# Patient Record
Sex: FEMALE | Race: WHITE | NOT HISPANIC OR LATINO | Employment: UNEMPLOYED | URBAN - METROPOLITAN AREA
[De-identification: names, ages, dates, MRNs, and addresses within clinical notes are randomized per-mention and may not be internally consistent; named-entity substitution may affect disease eponyms.]

---

## 2019-02-07 ENCOUNTER — APPOINTMENT (OUTPATIENT)
Dept: WOMENS IMAGING | Facility: HOSPITAL | Age: 44
End: 2019-02-07

## 2019-02-07 PROCEDURE — 77063 BREAST TOMOSYNTHESIS BI: CPT | Performed by: RADIOLOGY

## 2019-02-07 PROCEDURE — 77067 SCR MAMMO BI INCL CAD: CPT | Performed by: RADIOLOGY

## 2020-04-20 ENCOUNTER — TRANSCRIBE ORDERS (OUTPATIENT)
Dept: ADMINISTRATIVE | Facility: HOSPITAL | Age: 45
End: 2020-04-20

## 2020-04-20 DIAGNOSIS — N63.20 LUMP OF LEFT BREAST: Primary | ICD-10-CM

## 2020-04-22 ENCOUNTER — APPOINTMENT (OUTPATIENT)
Dept: MAMMOGRAPHY | Facility: HOSPITAL | Age: 45
End: 2020-04-22

## 2020-04-22 ENCOUNTER — APPOINTMENT (OUTPATIENT)
Dept: ULTRASOUND IMAGING | Facility: HOSPITAL | Age: 45
End: 2020-04-22

## 2020-04-22 ENCOUNTER — APPOINTMENT (OUTPATIENT)
Dept: WOMENS IMAGING | Facility: HOSPITAL | Age: 45
End: 2020-04-22

## 2020-04-22 PROCEDURE — 76641 ULTRASOUND BREAST COMPLETE: CPT | Performed by: RADIOLOGY

## 2020-04-22 PROCEDURE — G0279 TOMOSYNTHESIS, MAMMO: HCPCS | Performed by: RADIOLOGY

## 2020-04-22 PROCEDURE — 77066 DX MAMMO INCL CAD BI: CPT | Performed by: RADIOLOGY

## 2020-04-22 PROCEDURE — 77062 BREAST TOMOSYNTHESIS BI: CPT | Performed by: RADIOLOGY

## 2020-04-23 ENCOUNTER — APPOINTMENT (OUTPATIENT)
Dept: MAMMOGRAPHY | Facility: HOSPITAL | Age: 45
End: 2020-04-23

## 2020-04-23 ENCOUNTER — APPOINTMENT (OUTPATIENT)
Dept: ULTRASOUND IMAGING | Facility: HOSPITAL | Age: 45
End: 2020-04-23

## 2020-05-01 ENCOUNTER — APPOINTMENT (OUTPATIENT)
Dept: WOMENS IMAGING | Facility: HOSPITAL | Age: 45
End: 2020-05-01

## 2020-05-01 PROCEDURE — 19083 BX BREAST 1ST LESION US IMAG: CPT | Performed by: RADIOLOGY

## 2020-05-01 PROCEDURE — 19084 BX BREAST ADD LESION US IMAG: CPT | Performed by: RADIOLOGY

## 2020-05-01 PROCEDURE — 19000 PUNCTURE ASPIR CYST BREAST: CPT | Performed by: RADIOLOGY

## 2020-08-20 ENCOUNTER — PREP FOR SURGERY (OUTPATIENT)
Dept: OTHER | Facility: HOSPITAL | Age: 45
End: 2020-08-20

## 2020-08-20 ENCOUNTER — OFFICE VISIT (OUTPATIENT)
Dept: GASTROENTEROLOGY | Facility: CLINIC | Age: 45
End: 2020-08-20

## 2020-08-20 VITALS
SYSTOLIC BLOOD PRESSURE: 122 MMHG | OXYGEN SATURATION: 98 % | BODY MASS INDEX: 24.96 KG/M2 | HEART RATE: 78 BPM | DIASTOLIC BLOOD PRESSURE: 78 MMHG | HEIGHT: 64 IN | WEIGHT: 146.2 LBS | TEMPERATURE: 98.7 F

## 2020-08-20 DIAGNOSIS — Z12.11 COLON CANCER SCREENING: ICD-10-CM

## 2020-08-20 DIAGNOSIS — E53.8 VITAMIN B12 DEFICIENCY: Primary | ICD-10-CM

## 2020-08-20 DIAGNOSIS — D51.9 ANEMIA DUE TO VITAMIN B12 DEFICIENCY, UNSPECIFIED B12 DEFICIENCY TYPE: ICD-10-CM

## 2020-08-20 DIAGNOSIS — D64.9 ANEMIA, UNSPECIFIED TYPE: ICD-10-CM

## 2020-08-20 DIAGNOSIS — E55.9 VITAMIN D DEFICIENCY: ICD-10-CM

## 2020-08-20 DIAGNOSIS — E53.8 VITAMIN B12 DEFICIENCY: ICD-10-CM

## 2020-08-20 PROCEDURE — 99204 OFFICE O/P NEW MOD 45 MIN: CPT | Performed by: INTERNAL MEDICINE

## 2020-08-20 RX ORDER — MELATONIN
1000 3 TIMES WEEKLY
COMMUNITY

## 2020-08-20 NOTE — PROGRESS NOTES
Colonoscopy (discuss CLS; no previous; vitamin b12 deficiency)      HPI    44-year old female presents to the office today as a new patient (previous patient) to discuss anemia and possible need for CLS. She reports being identified as having low vitamin B12 and vitamin D. She is currently receiving injections of Vitamin B12 now twice monthly. She reports heavy menstrual cycles. She takes Vitamin D deficiency 1000 IU 3 x weekly.     She denies any upper or lower GI symptoms. She denies any rectal bleeding or change in bowel habits.     She has never had a colonoscopy. She denies any family history of colon cancer.     Review of Systems   Constitutional: Negative for appetite change, chills, diaphoresis, fatigue, fever and unexpected weight change.   HENT: Negative for dental problem, ear pain, mouth sores, rhinorrhea, sore throat and voice change.    Eyes: Negative for pain, redness and visual disturbance.   Respiratory: Negative for cough, chest tightness and wheezing.    Cardiovascular: Negative for chest pain, palpitations and leg swelling.   Endocrine: Negative for cold intolerance, heat intolerance, polydipsia, polyphagia and polyuria.   Genitourinary: Negative for dysuria, frequency, hematuria and urgency.   Musculoskeletal: Negative for arthralgias, back pain, joint swelling, myalgias and neck pain.   Skin: Negative for rash.   Allergic/Immunologic: Negative for environmental allergies, food allergies and immunocompromised state.   Neurological: Negative for dizziness, seizures, weakness, numbness and headaches.   Hematological: Does not bruise/bleed easily.   Psychiatric/Behavioral: Negative for sleep disturbance. The patient is not nervous/anxious.         I have reviewed and confirmed the accuracy of the HPI and ROS as documented by the APRN TRENTON Ruiz     Problem List:  There is no problem list on file for this patient.      Medical History:    Past Medical History:   Diagnosis Date   • B12  "deficiency    • Vitamin D deficiency         Social History:    Social History     Socioeconomic History   • Marital status:      Spouse name: Not on file   • Number of children: Not on file   • Years of education: Not on file   • Highest education level: Not on file   Tobacco Use   • Smoking status: Never Smoker   • Smokeless tobacco: Never Used   Substance and Sexual Activity   • Alcohol use: Yes     Comment: social   • Drug use: Never       Family History:   Family History   Problem Relation Age of Onset   • Lung cancer Maternal Grandmother    • Colon cancer Neg Hx    • Colon polyps Neg Hx    • Ulcerative colitis Neg Hx    • Irritable bowel syndrome Neg Hx    • Crohn's disease Neg Hx        Surgical History:   Past Surgical History:   Procedure Laterality Date   • BREAST SURGERY     • TONSILLECTOMY     • WRIST SURGERY           Current Outpatient Medications:   •  cholecalciferol (VITAMIN D3) 25 MCG (1000 UT) tablet, Take 1,000 Units by mouth 3 (Three) Times a Week., Disp: , Rfl:   •  Norethin-Eth Estrad-Fe Biphas (Lo Loestrin Fe) 1 MG-10 MCG / 10 MCG tablet, Daily., Disp: , Rfl:   •  Cyanocobalamin 1000 MCG/ML kit, Inject 1 mL as directed Every 14 (Fourteen) Days., Disp: 2 each, Rfl: 3  •  Needle, Disp, (BD Disp Needles) 25G X 5/8\" misc, Use with B-12 injection twice monthly, Disp: 2 each, Rfl: 3    Allergies:   Allergies   Allergen Reactions   • Cat Hair Extract Hives        The following portions of the patient's history were reviewed and updated as appropriate: allergies, current medications, past family history, past medical history, past social history, past surgical history and problem list.    Vitals:    08/20/20 1411   BP: 122/78   Pulse: 78   Temp: 98.7 °F (37.1 °C)   SpO2: 98%         08/20/20  1411   Weight: 66.3 kg (146 lb 3.2 oz)     Body mass index is 25.1 kg/m².      Physical Exam   HENT:   Nose: No nasal deformity.   Eyes: No scleral icterus.   Neck:   Trachea midline.   Cardiovascular: " Normal rate and regular rhythm.   Pulmonary/Chest: Breath sounds normal. No respiratory distress.   Abdominal: Soft. Normal appearance and bowel sounds are normal. She exhibits no distension and no mass. There is no tenderness.   Lymphadenopathy:   No periumbilical lymphadenopathy.   Neurological: She is alert.   Skin: Skin is warm. No cyanosis.   Psychiatric: She has a normal mood and affect.   Vitals reviewed.       Assessment/ Plan  Lelo was seen today for colonoscopy.    Diagnoses and all orders for this visit:    Vitamin B12 deficiency  -     Folate  -     Vitamin B12  -     Vitamin D 25 Hydroxy  -     Anti-parietal Antibody; Future  -     Methylmalonic Acid, Serum  -     Gastrin; Future    Vitamin D deficiency  -     Folate  -     Vitamin B12  -     Vitamin D 25 Hydroxy  -     Anti-parietal Antibody; Future  -     Methylmalonic Acid, Serum  -     Gastrin; Future    Anemia due to vitamin B12 deficiency, unspecified B12 deficiency type  -     Folate  -     Vitamin B12  -     Vitamin D 25 Hydroxy  -     Anti-parietal Antibody; Future  -     Methylmalonic Acid, Serum  -     Gastrin; Future    Colon cancer screening  -     Folate  -     Vitamin B12  -     Vitamin D 25 Hydroxy  -     Anti-parietal Antibody; Future  -     Methylmalonic Acid, Serum  -     Gastrin; Future         Return for at procedures.    There are no Patient Instructions on file for this visit.    Documentation by Janette CHOWDHURY acting as a scribe in the following sections (HPI, Assessment, Plan) for the undersigned provider.     Discussion:

## 2020-08-23 LAB
25(OH)D3+25(OH)D2 SERPL-MCNC: 60.7 NG/ML (ref 30–100)
FOLATE SERPL-MCNC: 3.68 NG/ML (ref 4.78–24.2)
GASTRIN SERPL-MCNC: 25 PG/ML (ref 0–115)
Lab: NORMAL
METHYLMALONATE SERPL-SCNC: 101 NMOL/L (ref 0–378)
PCA AB SER-ACNC: 1.3 UNITS (ref 0–20)
VIT B12 SERPL-MCNC: 942 PG/ML (ref 211–946)

## 2020-08-25 ENCOUNTER — TELEPHONE (OUTPATIENT)
Dept: GASTROENTEROLOGY | Facility: CLINIC | Age: 45
End: 2020-08-25

## 2020-10-02 ENCOUNTER — OFFICE VISIT (OUTPATIENT)
Dept: GASTROENTEROLOGY | Facility: CLINIC | Age: 45
End: 2020-10-02

## 2020-10-02 DIAGNOSIS — E53.8 FOLATE DEFICIENCY: ICD-10-CM

## 2020-10-02 DIAGNOSIS — E53.8 VITAMIN B12 DEFICIENCY: Primary | ICD-10-CM

## 2020-10-02 DIAGNOSIS — E55.9 VITAMIN D DEFICIENCY: ICD-10-CM

## 2020-10-02 DIAGNOSIS — Z12.11 COLON CANCER SCREENING: ICD-10-CM

## 2020-10-02 PROCEDURE — 99443 PR PHYS/QHP TELEPHONE EVALUATION 21-30 MIN: CPT | Performed by: NURSE PRACTITIONER

## 2020-10-02 RX ORDER — ALBUTEROL SULFATE 90 UG/1
2 AEROSOL, METERED RESPIRATORY (INHALATION)
COMMUNITY
Start: 2020-09-09 | End: 2022-05-03

## 2020-10-02 RX ORDER — NEEDLES, DISPOSABLE 25GX5/8"
NEEDLE, DISPOSABLE MISCELLANEOUS
Qty: 4 EACH | Refills: 0 | Status: SHIPPED | OUTPATIENT
Start: 2020-10-02 | End: 2020-11-13

## 2020-10-02 RX ORDER — CYANOCOBALAMIN 1000 UG/ML
1000 INJECTION, SOLUTION INTRAMUSCULAR; SUBCUTANEOUS WEEKLY
Status: DISCONTINUED | OUTPATIENT
Start: 2020-10-02 | End: 2020-10-06

## 2020-10-02 RX ORDER — NITROFURANTOIN 25; 75 MG/1; MG/1
CAPSULE ORAL AS NEEDED
COMMUNITY
Start: 2020-07-11 | End: 2022-05-03

## 2020-10-02 NOTE — PATIENT INSTRUCTIONS
1.  For vitamin B12 deficiency, we will prescribe vitamin B12 1000 mcg injections weekly x4.  We will then plan to recheck your vitamin B12 level to determine the best dosing schedule to manage symptoms.    2.  Continue folate supplement with folic acid 1 mg daily.    3.  Continue vitamin D3 supplement with 1000 IU daily.    4.  We will plan to proceed with EGD and colonoscopy as ordered, but will extend out the date of your procedures as outlined in the discussion section of this office visit.

## 2020-10-02 NOTE — PROGRESS NOTES
"Chief Complaint   Patient presents with   • Follow-up     Discuss Vit B-12 levels        HPI  44-year-old female presents today for telephone follow-up visit.  She was last seen in office on 8/20/2020.  She has a history of vitamin B12 and vitamin D deficiency and is currently being evaluated for possible pernicious anemia.     Lab work-up for pernicious anemia demonstrated normal findings on antiparietal cell antibody, gastrin, methylmalonic acid, and vitamin D.  Vitamin D level was normal at 60.7, however patient was already taking Vitamin D supplement at that time. Folate level was mildly low at 3.68 and patient was started on folic acid 1 mg daily, which she continues.  Plan of care at last office visit was to proceed with EGD for further work up of possible pernicious anemia and perform a screening colonoscopy.  She is currently scheduled for her EGD and colonoscopy on 10/15/2020.     She denies having any heartburn or reflux symptoms.  She will experience a stomachach and weakness when her vitamin B12 is low.  She states that she always \"knows\" when her level is low, as she will experience a stomach ache, become weak, and experience constipation.  Her most recent vitamin B12 level drawn on 8/20/2020 was 942.  This was her level after she had received a total of 4 weekly vitamin B12 injections, followed by another 3 injections the following month.  Initially she was prescribed vitamin B12 1000 mcg injections monthly to follow her weekly injections for 1 month; however, due to an increase in the above symptoms, she reports paying out-of-pocket for 2 separate injections with Dr. Shaq Rich which greatly improved her overall health.    Sh reports having regular daily bowel movements and denies having any diarrhea, constipation, melena, or hematochezia.  She reports that her weight is stable.  She feels that if she could receive at least 3 vitamin B12 injections per month that she would feel much " better.    You have chosen to receive care through a telephone visit today. Do you consent to use a telephone visit for your medical care today? Yes      Review of Systems   Constitutional: Negative for appetite change, chills, diaphoresis, fatigue, fever and unexpected weight change.   HENT: Negative for dental problem, ear pain, mouth sores, rhinorrhea, sore throat and voice change.    Eyes: Negative for pain, redness and visual disturbance.   Respiratory: Negative for cough, chest tightness and wheezing.    Cardiovascular: Negative for chest pain, palpitations and leg swelling.   Endocrine: Negative for cold intolerance, heat intolerance, polydipsia, polyphagia and polyuria.   Genitourinary: Negative for dysuria, frequency, hematuria and urgency.   Musculoskeletal: Negative for arthralgias, back pain, joint swelling, myalgias and neck pain.   Skin: Negative for rash.   Allergic/Immunologic: Positive for environmental allergies. Negative for food allergies and immunocompromised state.   Neurological: Negative for dizziness, seizures, weakness, numbness and headaches.   Hematological: Does not bruise/bleed easily.   Psychiatric/Behavioral: Negative for sleep disturbance. The patient is not nervous/anxious.        Problem List:  There is no problem list on file for this patient.      Medical History:    Past Medical History:   Diagnosis Date   • B12 deficiency    • Vitamin D deficiency         Social History:    Social History     Socioeconomic History   • Marital status:      Spouse name: Not on file   • Number of children: Not on file   • Years of education: Not on file   • Highest education level: Not on file   Tobacco Use   • Smoking status: Never Smoker   • Smokeless tobacco: Never Used   Substance and Sexual Activity   • Alcohol use: Yes     Comment: social   • Drug use: Never   • Sexual activity: Defer       Family History:   Family History   Problem Relation Age of Onset   • Lung cancer Maternal  "Grandmother    • Irritable bowel syndrome Mother    • Colon cancer Neg Hx    • Colon polyps Neg Hx    • Ulcerative colitis Neg Hx    • Crohn's disease Neg Hx        Surgical History:   Past Surgical History:   Procedure Laterality Date   • BREAST SURGERY     • TONSILLECTOMY     • WRIST SURGERY           Current Outpatient Medications:   •  albuterol sulfate HFA (ProAir HFA) 108 (90 Base) MCG/ACT inhaler, Inhale 2 puffs., Disp: , Rfl:   •  cholecalciferol (VITAMIN D3) 25 MCG (1000 UT) tablet, Take 1,000 Units by mouth 3 (Three) Times a Week., Disp: , Rfl:   •  Cyanocobalamin 1000 MCG/ML kit, Inject 1 mL as directed Every 14 (Fourteen) Days., Disp: 2 each, Rfl: 3  •  Needle, Disp, ([employer] Disp Needles) 25G X 5/8\" misc, Use with B-12 injection twice monthly, Disp: 2 each, Rfl: 3  •  nitrofurantoin, macrocrystal-monohydrate, (MACROBID) 100 MG capsule, As Needed., Disp: , Rfl:   •  Norethin-Eth Estrad-Fe Biphas (Lo Loestrin Fe) 1 MG-10 MCG / 10 MCG tablet, Daily., Disp: , Rfl:     Allergies:  Cat hair extract    The following portions of the patient's history were reviewed and updated as appropriate: allergies, current medications, past family history, past medical history, past social history, past surgical history and problem list.    Assessment/ Plan  Lelo was seen today for follow-up.    Diagnoses and all orders for this visit:    Vitamin B12 deficiency    Folate deficiency    Colon cancer screening         Return for Follow-up after EGD and colonoscopy.    Patient Instructions   1.  For vitamin B12 deficiency, we will prescribe vitamin B12 1000 mcg injections weekly x4.  We will then plan to recheck your vitamin B12 level to determine the best dosing schedule to manage symptoms.    2.  Continue folate supplement with folic acid 1 mg daily.    3.  Continue vitamin D3 supplement with 1000 IU daily.    4.  We will plan to proceed with EGD and colonoscopy as ordered, but will extend out the date of your procedures as " outlined in the discussion section of this office visit.      Discussion:  Discussion was held with the patient today regarding management of her vitamin B12 deficiency.  We will place patient on vitamin B12 1000 mcg via injection weekly x 4.  We will then plan to check a vitamin B12 level to determine the most appropriate dosing schedule.  Patient to continue daily folic acid supplementation and daily vitamin D supplementation.    Due to the patient's concerns over her son's symptoms, who is also a patient in this practice, she requests that her procedures be postponed and rescheduled for a future date so that her son can have his procedures performed during her already schedule time slot on 10/15/2020.  APRN reviewed change with Dr. Vargas's .  Patient has been rescheduled for January 2020 for both her EGD and colonoscopy.  We will plan for office follow-up after procedures to discuss results and reassess symptoms.  Patient verbalized understanding of above.  All questions answered and support provided.    This visit was completed as a telephone visit due to COVID-19 pandemic. This visit has been rescheduled as a phone visit to comply with patient safety concerns in accordance with CDC recommendations. Total time of discussion was 22 minutes.

## 2020-10-05 ENCOUNTER — TELEPHONE (OUTPATIENT)
Dept: GASTROENTEROLOGY | Facility: CLINIC | Age: 45
End: 2020-10-05

## 2020-10-05 NOTE — TELEPHONE ENCOUNTER
Spoke with patient, she stated that you made changes to her Vitamin B-12 but did not send in the medication. She received the needles. Please review and send in Rx.      Thanks,  Dayne

## 2020-10-06 DIAGNOSIS — E53.8 VITAMIN B12 DEFICIENCY: Primary | ICD-10-CM

## 2020-10-06 RX ORDER — CYANOCOBALAMIN 1000 UG/ML
1000 INJECTION, SOLUTION INTRAMUSCULAR; SUBCUTANEOUS WEEKLY
Status: SHIPPED | OUTPATIENT
Start: 2020-10-06 | End: 2020-11-03

## 2020-10-07 NOTE — TELEPHONE ENCOUNTER
Spoke with patient, message below passed to her in full detail. She understands and will  Rx. Nothing further needed.      TS

## 2020-11-06 ENCOUNTER — RESULTS ENCOUNTER (OUTPATIENT)
Dept: GASTROENTEROLOGY | Facility: CLINIC | Age: 45
End: 2020-11-06

## 2020-11-06 DIAGNOSIS — E53.8 VITAMIN B12 DEFICIENCY: ICD-10-CM

## 2020-11-11 LAB — VIT B12 SERPL-MCNC: 1011 PG/ML (ref 211–946)

## 2020-11-13 RX ORDER — NEEDLES, FILTER 19GX1 1/2"
NEEDLE, DISPOSABLE MISCELLANEOUS
Qty: 4 EACH | Refills: 1 | Status: SHIPPED | OUTPATIENT
Start: 2020-11-13 | End: 2021-03-08

## 2020-12-09 ENCOUNTER — TELEPHONE (OUTPATIENT)
Dept: GASTROENTEROLOGY | Facility: CLINIC | Age: 45
End: 2020-12-09

## 2020-12-09 NOTE — TELEPHONE ENCOUNTER
----- Message from Lelo Baird sent at 12/9/2020 11:27 AM EST -----  Regarding: Prescription Question  Contact: [phone]  Hi Chris miranda! Sorry to bother you. I was able to go to Guero's MyChart to refill request his B12 refill with no problem and the pharmacy has it ready but it is not listed on mine. So I'm requesting a refill via email. Also just wanted to touch base. Guero is at his food intolerance appointment as we speak! 2 weeks ago he had a rough spot where he was so nauseous that he could not eat or hardly get out of bed. (Im shweta thinking it may have been a virus ) but he's still shweta weak feeling. I had some zofran left over from his gallbladder surgery so he took three pills over a three day period. 1 per day. Just to be able to eat. He's better now but missed a few days of class. Hopefully we are heading back on the right track. Looking forward to our follow up next Friday I believe. Have a great rest of the week!

## 2020-12-10 RX ORDER — CYANOCOBALAMIN 1000 UG/ML
1000 INJECTION, SOLUTION INTRAMUSCULAR; SUBCUTANEOUS WEEKLY
Qty: 4 ML | Refills: 5 | Status: SHIPPED | OUTPATIENT
Start: 2020-12-10 | End: 2021-04-03 | Stop reason: SDUPTHER

## 2020-12-10 RX ORDER — CYANOCOBALAMIN 1000 UG/ML
1000 INJECTION, SOLUTION INTRAMUSCULAR; SUBCUTANEOUS WEEKLY
Qty: 4 ML | Refills: 5 | Status: SHIPPED | OUTPATIENT
Start: 2020-12-10 | End: 2020-12-10 | Stop reason: SDUPTHER

## 2021-01-27 ENCOUNTER — TRANSCRIBE ORDERS (OUTPATIENT)
Dept: LAB | Facility: SURGERY CENTER | Age: 46
End: 2021-01-27

## 2021-01-27 DIAGNOSIS — Z01.818 OTHER SPECIFIED PRE-OPERATIVE EXAMINATION: Primary | ICD-10-CM

## 2021-02-03 ENCOUNTER — PREP FOR SURGERY (OUTPATIENT)
Dept: SURGERY | Facility: SURGERY CENTER | Age: 46
End: 2021-02-03

## 2021-02-03 ENCOUNTER — TELEPHONE (OUTPATIENT)
Dept: GASTROENTEROLOGY | Facility: CLINIC | Age: 46
End: 2021-02-03

## 2021-02-03 DIAGNOSIS — K22.70 BARRETT'S ESOPHAGUS WITHOUT DYSPLASIA: Primary | ICD-10-CM

## 2021-02-03 RX ORDER — SODIUM CHLORIDE, SODIUM LACTATE, POTASSIUM CHLORIDE, CALCIUM CHLORIDE 600; 310; 30; 20 MG/100ML; MG/100ML; MG/100ML; MG/100ML
30 INJECTION, SOLUTION INTRAVENOUS CONTINUOUS PRN
Status: CANCELLED | OUTPATIENT
Start: 2021-02-03

## 2021-02-03 RX ORDER — SODIUM CHLORIDE 0.9 % (FLUSH) 0.9 %
10 SYRINGE (ML) INJECTION AS NEEDED
Status: CANCELLED | OUTPATIENT
Start: 2021-02-03

## 2021-02-03 RX ORDER — SODIUM CHLORIDE 0.9 % (FLUSH) 0.9 %
3 SYRINGE (ML) INJECTION EVERY 12 HOURS SCHEDULED
Status: CANCELLED | OUTPATIENT
Start: 2021-02-03

## 2021-02-03 NOTE — TELEPHONE ENCOUNTER
Pt called and said that she has a procedure on 2/5/2021 with Dr. Vargas. Pt found out that her mom has Covid and the pt has been around her mom. Pt would like to reschedule her appt.

## 2021-02-04 NOTE — TELEPHONE ENCOUNTER
Called patient again still full sent another sms but patient is on for April and had already cancelled 2/5

## 2021-02-19 ENCOUNTER — TRANSCRIBE ORDERS (OUTPATIENT)
Dept: LAB | Facility: SURGERY CENTER | Age: 46
End: 2021-02-19

## 2021-02-19 DIAGNOSIS — Z01.818 OTHER SPECIFIED PRE-OPERATIVE EXAMINATION: Primary | ICD-10-CM

## 2021-03-08 RX ORDER — NEEDLES, FILTER 19GX1 1/2"
NEEDLE, DISPOSABLE MISCELLANEOUS
Qty: 4 EACH | Refills: 1 | Status: SHIPPED | OUTPATIENT
Start: 2021-03-08 | End: 2021-04-03 | Stop reason: SDUPTHER

## 2021-04-05 RX ORDER — NEEDLES, FILTER 19GX1 1/2"
NEEDLE, DISPOSABLE MISCELLANEOUS
Qty: 4 EACH | Refills: 1 | Status: SHIPPED | OUTPATIENT
Start: 2021-04-05 | End: 2021-06-09

## 2021-04-05 RX ORDER — CYANOCOBALAMIN 1000 UG/ML
1000 INJECTION, SOLUTION INTRAMUSCULAR; SUBCUTANEOUS WEEKLY
Qty: 4 ML | Refills: 5 | Status: SHIPPED | OUTPATIENT
Start: 2021-04-05 | End: 2021-06-09

## 2021-04-14 ENCOUNTER — APPOINTMENT (OUTPATIENT)
Dept: LAB | Facility: SURGERY CENTER | Age: 46
End: 2021-04-14

## 2021-06-09 RX ORDER — NEEDLES, FILTER 19GX1 1/2"
NEEDLE, DISPOSABLE MISCELLANEOUS
Qty: 4 EACH | Refills: 1 | Status: SHIPPED | OUTPATIENT
Start: 2021-06-09

## 2021-06-09 RX ORDER — CYANOCOBALAMIN 1000 UG/ML
1000 INJECTION, SOLUTION INTRAMUSCULAR; SUBCUTANEOUS WEEKLY
Qty: 4 ML | Refills: 5 | Status: SHIPPED | OUTPATIENT
Start: 2021-06-09 | End: 2021-08-11 | Stop reason: SDUPTHER

## 2021-06-09 RX ORDER — CYANOCOBALAMIN 1000 UG/ML
INJECTION, SOLUTION INTRAMUSCULAR; SUBCUTANEOUS
Qty: 4 ML | Refills: 5 | Status: SHIPPED | OUTPATIENT
Start: 2021-06-09 | End: 2021-06-09 | Stop reason: SDUPTHER

## 2021-08-11 RX ORDER — CYANOCOBALAMIN 1000 UG/ML
1000 INJECTION, SOLUTION INTRAMUSCULAR; SUBCUTANEOUS WEEKLY
Qty: 4 ML | Refills: 5 | Status: SHIPPED | OUTPATIENT
Start: 2021-08-11 | End: 2021-09-01 | Stop reason: SDUPTHER

## 2021-09-01 RX ORDER — CYANOCOBALAMIN 1000 UG/ML
1000 INJECTION, SOLUTION INTRAMUSCULAR; SUBCUTANEOUS WEEKLY
Qty: 4 ML | Refills: 5 | Status: SHIPPED | OUTPATIENT
Start: 2021-09-01 | End: 2021-11-09

## 2021-11-09 RX ORDER — CYANOCOBALAMIN 1000 UG/ML
INJECTION, SOLUTION INTRAMUSCULAR; SUBCUTANEOUS
Qty: 4 ML | Refills: 12 | Status: SHIPPED | OUTPATIENT
Start: 2021-11-09 | End: 2022-10-17

## 2022-03-14 ENCOUNTER — TRANSCRIBE ORDERS (OUTPATIENT)
Dept: ADMINISTRATIVE | Facility: HOSPITAL | Age: 47
End: 2022-03-14

## 2022-03-14 DIAGNOSIS — Z12.31 VISIT FOR SCREENING MAMMOGRAM: Primary | ICD-10-CM

## 2022-09-06 ENCOUNTER — PREP FOR SURGERY (OUTPATIENT)
Dept: SURGERY | Facility: SURGERY CENTER | Age: 47
End: 2022-09-06

## 2022-09-06 ENCOUNTER — OFFICE VISIT (OUTPATIENT)
Dept: GASTROENTEROLOGY | Facility: CLINIC | Age: 47
End: 2022-09-06

## 2022-09-06 VITALS
HEART RATE: 83 BPM | OXYGEN SATURATION: 99 % | DIASTOLIC BLOOD PRESSURE: 62 MMHG | SYSTOLIC BLOOD PRESSURE: 104 MMHG | TEMPERATURE: 97.8 F | WEIGHT: 151.4 LBS | HEIGHT: 65 IN | BODY MASS INDEX: 25.22 KG/M2

## 2022-09-06 DIAGNOSIS — Z79.2 ANTIBIOTIC LONG-TERM USE: ICD-10-CM

## 2022-09-06 DIAGNOSIS — Z12.11 COLON CANCER SCREENING: ICD-10-CM

## 2022-09-06 DIAGNOSIS — R14.0 ABDOMINAL BLOATING: Primary | ICD-10-CM

## 2022-09-06 DIAGNOSIS — K58.0 IRRITABLE BOWEL SYNDROME WITH DIARRHEA: ICD-10-CM

## 2022-09-06 DIAGNOSIS — R14.0 ABDOMINAL BLOATING: Primary | Chronic | ICD-10-CM

## 2022-09-06 PROCEDURE — 99214 OFFICE O/P EST MOD 30 MIN: CPT | Performed by: INTERNAL MEDICINE

## 2022-09-06 RX ORDER — SODIUM CHLORIDE 0.9 % (FLUSH) 0.9 %
10 SYRINGE (ML) INJECTION AS NEEDED
Status: CANCELLED | OUTPATIENT
Start: 2022-09-06

## 2022-09-06 RX ORDER — SODIUM CHLORIDE 0.9 % (FLUSH) 0.9 %
3 SYRINGE (ML) INJECTION EVERY 12 HOURS SCHEDULED
Status: CANCELLED | OUTPATIENT
Start: 2022-09-06

## 2022-09-06 RX ORDER — LEVOMEFOLATE CALCIUM 15 MG
TABLET ORAL
COMMUNITY

## 2022-09-06 RX ORDER — SODIUM CHLORIDE, SODIUM LACTATE, POTASSIUM CHLORIDE, CALCIUM CHLORIDE 600; 310; 30; 20 MG/100ML; MG/100ML; MG/100ML; MG/100ML
30 INJECTION, SOLUTION INTRAVENOUS CONTINUOUS PRN
Status: CANCELLED | OUTPATIENT
Start: 2022-09-06

## 2022-09-06 RX ORDER — PROMETHAZINE HYDROCHLORIDE 25 MG/1
1 TABLET ORAL EVERY 6 HOURS PRN
COMMUNITY
Start: 2022-05-04

## 2022-09-06 RX ORDER — NITROFURANTOIN 25; 75 MG/1; MG/1
CAPSULE ORAL
COMMUNITY
Start: 2022-08-15

## 2022-09-06 NOTE — PATIENT INSTRUCTIONS
We will place you on a course of Xifaxan. You will take 1 tab 3 x daily x 14 days. Coupon copay card provided. Please visit [employer] and fill out the required form to receive your medication for $0.     2.  We will schedule your EGD and colonoscopy.

## 2022-09-06 NOTE — PROGRESS NOTES
"Chief Complaint   Patient presents with   • Bloated   • Colon Cancer Screening         History of Present Illness  46-year old female presents today for follow up. She did have a case of [place] Spotted Fever following a tick bite. She did not contract Lyme's disease. She went to Urgent Care in [facility] and had the tick removed. She started treatment for Lyme's disease-although it was negative. Therapy was then discontinued. This occurred in May.    Vitamin B12 levels are normal. She continues therapy.     She reports a new findings of MTHFR-heterozygous. She has not taken L-methylfolate as of yet.     She continues to experience significant abdominal bloating. She has been on many antibiotics due to tick bite and subsequent treatment.     She was scheduled for a colonoscopy, but this was cancelled due to the death of her mother.     Review of Systems   Gastrointestinal: Positive for abdominal distention.        Result Review :           Vital Signs:   /62   Pulse 83   Temp 97.8 °F (36.6 °C)   Ht 163.8 cm (64.5\")   Wt 68.7 kg (151 lb 6.4 oz)   SpO2 99%   BMI 25.59 kg/m²     Body mass index is 25.59 kg/m².     Physical Exam      Assessment and Plan    Diagnoses and all orders for this visit:    1. Abdominal bloating (Primary)    2. Antibiotic long-term use    3. Irritable bowel syndrome with diarrhea    4. Colon cancer screening    Other orders  -     riFAXIMin (Xifaxan) 550 MG tablet; Take 1 tablet by mouth 3 (Three) Times a Day for 14 days.  Dispense: 42 tablet; Refill: 0       Documentation by Janette CHOWDHURY acting as a scribe in the following sections (HPI, Assessment, Plan) for the undersigned provider.       I have reviewed and confirmed the accuracy of the HPI and Assessment and Plan as documented by the TRENTON Fishman        Follow Up   Return for at procedures.    Patient Instructions   1.  We will place you on a course of Xifaxan. You will take 1 tab 3 x daily x 14 " days. Coupon copay card provided. Please visit [employer] and fill out the required form to receive your medication for $0.     2.  We will schedule your EGD and colonoscopy.

## 2022-09-07 PROBLEM — Z12.11 COLON CANCER SCREENING: Status: ACTIVE | Noted: 2022-09-07

## 2022-09-07 PROBLEM — Z79.2 ANTIBIOTIC LONG-TERM USE: Status: ACTIVE | Noted: 2022-09-07

## 2022-09-07 PROBLEM — R14.0 ABDOMINAL BLOATING: Status: ACTIVE | Noted: 2022-09-07

## 2022-09-09 ENCOUNTER — TELEPHONE (OUTPATIENT)
Dept: GASTROENTEROLOGY | Facility: CLINIC | Age: 47
End: 2022-09-09

## 2022-09-09 NOTE — TELEPHONE ENCOUNTER
Patient requesting Xifaxan be resent to [facility] in [place]. Pharmacy changed in chart.

## 2022-09-15 ENCOUNTER — TELEPHONE (OUTPATIENT)
Dept: GASTROENTEROLOGY | Facility: CLINIC | Age: 47
End: 2022-09-15

## 2022-09-15 RX ORDER — METRONIDAZOLE 250 MG/1
250 TABLET ORAL 3 TIMES DAILY
Qty: 30 TABLET | Refills: 0 | Status: SHIPPED | OUTPATIENT
Start: 2022-09-15 | End: 2022-09-25

## 2022-09-15 NOTE — TELEPHONE ENCOUNTER
PA was denied for xifaxan. Pt is asking to start another medication asap, she will be traveling next week. Thank you

## 2022-09-23 ENCOUNTER — TELEPHONE (OUTPATIENT)
Dept: GASTROENTEROLOGY | Facility: CLINIC | Age: 47
End: 2022-09-23

## 2022-09-23 DIAGNOSIS — Z18.39 EMBEDDED TICK OF UPPER BACK EXCLUDING SCAPULAR REGION, INITIAL ENCOUNTER: ICD-10-CM

## 2022-09-23 DIAGNOSIS — S20.459A EMBEDDED TICK OF UPPER BACK EXCLUDING SCAPULAR REGION, INITIAL ENCOUNTER: ICD-10-CM

## 2022-09-23 RX ORDER — FLUCONAZOLE 150 MG/1
150 TABLET ORAL ONCE
Qty: 2 TABLET | Refills: 0 | Status: SHIPPED | OUTPATIENT
Start: 2022-09-23 | End: 2022-09-23

## 2022-09-23 NOTE — TELEPHONE ENCOUNTER
Patient now has vaginal yeast infection after taking antibiotic.  Would like Diflucan sent to pharmacy if possible.

## 2022-10-17 RX ORDER — CYANOCOBALAMIN 1000 UG/ML
INJECTION, SOLUTION INTRAMUSCULAR; SUBCUTANEOUS
Qty: 4 ML | Refills: 12 | Status: SHIPPED | OUTPATIENT
Start: 2022-10-17

## 2023-03-01 ENCOUNTER — TELEPHONE (OUTPATIENT)
Dept: GASTROENTEROLOGY | Facility: CLINIC | Age: 48
End: 2023-03-01
Payer: OTHER GOVERNMENT

## 2023-04-25 ENCOUNTER — PREP FOR SURGERY (OUTPATIENT)
Dept: SURGERY | Facility: SURGERY CENTER | Age: 48
End: 2023-04-25
Payer: OTHER GOVERNMENT

## 2023-04-25 DIAGNOSIS — E53.8 VITAMIN B12 DEFICIENCY: Primary | ICD-10-CM

## 2023-04-25 DIAGNOSIS — Z79.2 ANTIBIOTIC LONG-TERM USE: ICD-10-CM

## 2023-04-25 DIAGNOSIS — Z12.11 COLON CANCER SCREENING: Primary | ICD-10-CM

## 2023-04-25 DIAGNOSIS — R14.0 ABDOMINAL BLOATING: ICD-10-CM

## 2023-04-25 RX ORDER — SODIUM CHLORIDE, SODIUM LACTATE, POTASSIUM CHLORIDE, CALCIUM CHLORIDE 600; 310; 30; 20 MG/100ML; MG/100ML; MG/100ML; MG/100ML
30 INJECTION, SOLUTION INTRAVENOUS CONTINUOUS PRN
OUTPATIENT
Start: 2023-04-25

## 2023-05-30 ENCOUNTER — TELEPHONE (OUTPATIENT)
Dept: GASTROENTEROLOGY | Facility: CLINIC | Age: 48
End: 2023-05-30

## 2023-08-15 ENCOUNTER — OFFICE VISIT (OUTPATIENT)
Dept: GASTROENTEROLOGY | Facility: CLINIC | Age: 48
End: 2023-08-15
Payer: OTHER GOVERNMENT

## 2023-08-15 VITALS
OXYGEN SATURATION: 98 % | WEIGHT: 146.7 LBS | HEIGHT: 65 IN | DIASTOLIC BLOOD PRESSURE: 60 MMHG | BODY MASS INDEX: 24.44 KG/M2 | HEART RATE: 88 BPM | TEMPERATURE: 97.9 F | SYSTOLIC BLOOD PRESSURE: 122 MMHG

## 2023-08-15 DIAGNOSIS — E53.8 VITAMIN B12 DEFICIENCY: Primary | ICD-10-CM

## 2023-08-15 DIAGNOSIS — Z12.11 COLON CANCER SCREENING: ICD-10-CM

## 2023-08-15 PROCEDURE — 99214 OFFICE O/P EST MOD 30 MIN: CPT | Performed by: NURSE PRACTITIONER

## 2023-08-15 RX ORDER — METHENAMINE, SODIUM PHOSPHATE, MONOBASIC, MONOHYDRATE, PHENYL SALICYLATE, METHYLENE BLUE, AND HYOSCYAMINE SULFATE 118; 40.8; 36; 10; .12 MG/1; MG/1; MG/1; MG/1; MG/1
1 CAPSULE ORAL
COMMUNITY
Start: 2023-06-22

## 2023-08-15 RX ORDER — RAMELTEON 8 MG/1
8 TABLET ORAL DAILY
COMMUNITY
Start: 2023-06-22

## 2023-08-15 RX ORDER — METRONIDAZOLE 250 MG/1
250 TABLET ORAL 3 TIMES DAILY
Qty: 30 TABLET | Refills: 1 | Status: SHIPPED | OUTPATIENT
Start: 2023-08-15

## 2023-08-15 RX ORDER — CYANOCOBALAMIN 1000 UG/ML
1000 INJECTION, SOLUTION INTRAMUSCULAR; SUBCUTANEOUS WEEKLY
Qty: 4 ML | Refills: 12 | Status: SHIPPED | OUTPATIENT
Start: 2023-08-15

## 2023-08-15 NOTE — PATIENT INSTRUCTIONS
For SIBO, we have sent in a prescription for Flagyl 250 mg take 1 tab 3 x daily x 10 days. We have provided one refill for this medication if you begin to experience a recurrence of symptoms over the next few months.     2.  Continue Vit B12 injections weekly. Refills have been sent to your pharmacy.     3.  Keep scheduled colonoscopy appointment in September.     4.  Recommend annual office follow up for reassessment of symptoms and med refills or sooner should symptoms worsen/recur.

## 2023-08-15 NOTE — PROGRESS NOTES
"Chief Complaint   Patient presents with    Follow-up     Vit B12 deficiency, SIBO         History of Present Illness  47-year-old female presents the office today for follow-up.  She was last seen in office on 9/6/2022.  She is a history of vitamin B12 deficiency/insufficiency and continues treatment. Most recent Vit B12 was 1188.     She has a history of MTHFR-heterozygous.  Use of L-methylfolate has been recommended. She just underwent [facility] testing as well.     She also has a history of SIBO. Treatment with Xifaxan was ineffective. Flagyl was effective and she reports that within 2 days her bowel habits regulated and her abdominal bloating was much improved. She has also been struggling with constipation.     At her last office visit she reported having a case of [place] spotted fever following a tick bite.  She was not noted to have Lyme's disease.  The tick was removed, but she did begin treatment for Lyme's disease prophylactically.    She is due for a colonoscopy at this time.  Her last colonoscopy was scheduled, but had to be canceled due to the death of her mother. She has her colonoscopy scheduled with Dr. Vargas this September.     She has followed up with GYN and is scheduled for an ultrasound next week and she has undergone lab testing.      Result Review :       Office Visit with Harmeet Vargas MD (09/06/2022)   THYROID PEROXIDASE ANTIBODY (08/03/2022 10:15)   VITAMIN D 25 HYDROXY (08/03/2022 10:15)   VITAMIN B12 (05/04/2022 15:14)   VITAMIN B12 (06/22/2023 15:23)     Vital Signs:   /60   Pulse 88   Temp 97.9 øF (36.6 øC)   Ht 163.8 cm (64.5\")   Wt 66.5 kg (146 lb 11.2 oz)   SpO2 98%   BMI 24.79 kg/mý     Body mass index is 24.79 kg/mý.     Physical Exam  Vitals reviewed.   Constitutional:       Appearance: Normal appearance.   Pulmonary:      Effort: Pulmonary effort is normal. No respiratory distress.   Abdominal:      General: Abdomen is flat. Bowel sounds are normal. " There is no distension.      Palpations: Abdomen is soft. There is no mass.      Tenderness: There is no abdominal tenderness. There is no guarding.   Musculoskeletal:         General: Normal range of motion.   Skin:     General: Skin is warm and dry.   Neurological:      General: No focal deficit present.      Mental Status: She is alert and oriented to person, place, and time.   Psychiatric:         Mood and Affect: Mood normal.         Behavior: Behavior normal.         Thought Content: Thought content normal.         Judgment: Judgment normal.     Assessment and Plan    Diagnoses and all orders for this visit:    1. Vitamin B12 deficiency (Primary)    2. Colon cancer screening    Other orders  -     metroNIDAZOLE (FLAGYL) 250 MG tablet; Take 1 tablet by mouth 3 (Three) Times a Day.  Dispense: 30 tablet; Refill: 1  -     cyanocobalamin 1000 MCG/ML injection; Inject 1 mL under the skin into the appropriate area as directed 1 (One) Time Per Week.  Dispense: 4 mL; Refill: 12           Patient Instructions   For SIBO, we have sent in a prescription for Flagyl 250 mg take 1 tab 3 x daily x 10 days. We have provided one refill for this medication if you begin to experience a recurrence of symptoms over the next few months.     2.  Continue Vit B12 injections weekly. Refills have been sent to your pharmacy.     3.  Keep scheduled colonoscopy appointment in September.     4.  Recommend annual office follow up for reassessment of symptoms and med refills or sooner should symptoms worsen/recur.       Discussion:    Patient to continue vitamin B12 injections weekly as this is worked very well to help manage her deficiency.  Refills have been provided to her pharmacy.    Patient is an upcoming colonoscopy scheduled in September, which we have recommended that she keep.    Patient has had a history of recurrent SIBO which has responded well to Flagyl in the past.  She is presenting with recurrent symptoms today including  abdominal bloating and change in bowel habits.  We will start her on Flagyl turn 50 mg 1 tablet 3 times daily for 10 days.  We have provided the patient with 1 refill in the event that she has a recurrence of symptoms over the next 12 months.  Plan for annual office follow-up for reassessment of symptoms and medication refills, or sooner should her symptoms worsen/recur.  Patient verbalized understanding of above plan of care and is in agreement.  All questions answered and support provided.    EMR Dragon/Transcription Disclaimer:  This document has been Dictated utilizing Dragon dictation.

## 2023-09-14 ENCOUNTER — ANESTHESIA (OUTPATIENT)
Dept: SURGERY | Facility: SURGERY CENTER | Age: 48
End: 2023-09-14
Payer: OTHER GOVERNMENT

## 2023-09-14 ENCOUNTER — ANESTHESIA EVENT (OUTPATIENT)
Dept: SURGERY | Facility: SURGERY CENTER | Age: 48
End: 2023-09-14
Payer: OTHER GOVERNMENT

## 2023-09-14 ENCOUNTER — HOSPITAL ENCOUNTER (OUTPATIENT)
Facility: SURGERY CENTER | Age: 48
Setting detail: HOSPITAL OUTPATIENT SURGERY
Discharge: HOME | End: 2023-09-14
Attending: INTERNAL MEDICINE | Admitting: INTERNAL MEDICINE
Payer: OTHER GOVERNMENT

## 2023-09-14 VITALS
HEIGHT: 64 IN | DIASTOLIC BLOOD PRESSURE: 97 MMHG | RESPIRATION RATE: 16 BRPM | TEMPERATURE: 97.3 F | WEIGHT: 148.4 LBS | SYSTOLIC BLOOD PRESSURE: 126 MMHG | HEART RATE: 81 BPM | BODY MASS INDEX: 25.33 KG/M2 | OXYGEN SATURATION: 98 %

## 2023-09-14 DIAGNOSIS — Z79.2 ANTIBIOTIC LONG-TERM USE: ICD-10-CM

## 2023-09-14 DIAGNOSIS — Z12.11 COLON CANCER SCREENING: ICD-10-CM

## 2023-09-14 DIAGNOSIS — R14.0 ABDOMINAL BLOATING: ICD-10-CM

## 2023-09-14 LAB
B-HCG UR QL: NEGATIVE
EXPIRATION DATE: ABNORMAL
INTERNAL NEGATIVE CONTROL: NEGATIVE
INTERNAL POSITIVE CONTROL: POSITIVE
Lab: ABNORMAL

## 2023-09-14 PROCEDURE — 45378 DIAGNOSTIC COLONOSCOPY: CPT | Performed by: INTERNAL MEDICINE

## 2023-09-14 PROCEDURE — 87081 CULTURE SCREEN ONLY: CPT | Performed by: INTERNAL MEDICINE

## 2023-09-14 PROCEDURE — 25010000002 PROPOFOL 10 MG/ML EMULSION: Performed by: STUDENT IN AN ORGANIZED HEALTH CARE EDUCATION/TRAINING PROGRAM

## 2023-09-14 PROCEDURE — 43239 EGD BIOPSY SINGLE/MULTIPLE: CPT | Performed by: INTERNAL MEDICINE

## 2023-09-14 PROCEDURE — 81025 URINE PREGNANCY TEST: CPT | Performed by: INTERNAL MEDICINE

## 2023-09-14 PROCEDURE — 88305 TISSUE EXAM BY PATHOLOGIST: CPT | Performed by: INTERNAL MEDICINE

## 2023-09-14 RX ORDER — SODIUM CHLORIDE, SODIUM LACTATE, POTASSIUM CHLORIDE, CALCIUM CHLORIDE 600; 310; 30; 20 MG/100ML; MG/100ML; MG/100ML; MG/100ML
20 INJECTION, SOLUTION INTRAVENOUS CONTINUOUS
Status: DISCONTINUED | OUTPATIENT
Start: 2023-09-14 | End: 2023-09-14 | Stop reason: HOSPADM

## 2023-09-14 RX ORDER — LIDOCAINE HYDROCHLORIDE 10 MG/ML
INJECTION, SOLUTION EPIDURAL; INFILTRATION; INTRACAUDAL; PERINEURAL AS NEEDED
Status: DISCONTINUED | OUTPATIENT
Start: 2023-09-14 | End: 2023-09-14 | Stop reason: SURG

## 2023-09-14 RX ADMIN — SODIUM CHLORIDE, POTASSIUM CHLORIDE, SODIUM LACTATE AND CALCIUM CHLORIDE 20 ML/HR: 600; 310; 30; 20 INJECTION, SOLUTION INTRAVENOUS at 11:11

## 2023-09-14 RX ADMIN — LIDOCAINE HYDROCHLORIDE 30 MG: 10 INJECTION, SOLUTION EPIDURAL; INFILTRATION; INTRACAUDAL at 11:48

## 2023-09-14 RX ADMIN — PROPOFOL 200 MCG/KG/MIN: 10 INJECTION, EMULSION INTRAVENOUS at 11:48

## 2023-09-14 NOTE — ANESTHESIA POSTPROCEDURE EVALUATION
Patient: Lelo Baird    Procedure Summary       Date: 09/14/23 Room / Location[facility]  [facility]    Anesthesia Start: 1143 Anesthesia Stop: 1220    Procedures:       COLONOSCOPY      ESOPHAGOGASTRODUODENOSCOPY Diagnosis:       Abdominal bloating      Colon cancer screening      Antibiotic long-term use      (Abdominal bloating [R14.0])      (Colon cancer screening [Z12.11])      (Antibiotic long-term use [Z79.2])    Surgeons: Harmeet Vargas MD Provider: Liam Ayon MD    Anesthesia Type: MAC ASA Status: 1            Anesthesia Type: MAC    Vitals  Vitals Value Taken Time   /76 09/14/23 1219   Temp 36.3 °C (97.3 °F) 09/14/23 1219   Pulse 89 09/14/23 1219   Resp 16 09/14/23 1219   SpO2 98 % 09/14/23 1219           Post Anesthesia Care and Evaluation    Patient location during evaluation: bedside  Patient participation: complete - patient participated  Level of consciousness: awake and alert  Pain management: adequate    Airway patency: patent  Anesthetic complications: No anesthetic complications  PONV Status: controlled  Cardiovascular status: blood pressure returned to baseline and acceptable  Respiratory status: acceptable  Hydration status: acceptable

## 2023-09-14 NOTE — H&P
"No chief complaint on file.      HPI  Bloating  Crc screening         Problem List:    Patient Active Problem List   Diagnosis    Abdominal bloating    Colon cancer screening    Antibiotic long-term use       Medical History:    Past Medical History:   Diagnosis Date    B12 deficiency     Lyme disease     Raymundo Mountain spotted fever     Vitamin D deficiency         Social History:    Social History     Socioeconomic History    Marital status:    Tobacco Use    Smoking status: Never    Smokeless tobacco: Never   Vaping Use    Vaping Use: Never used   Substance and Sexual Activity    Alcohol use: Yes     Comment: social    Drug use: Never    Sexual activity: Defer       Family History:   Family History   Problem Relation Age of Onset    Lung cancer Maternal Grandmother     Irritable bowel syndrome Mother     Colon cancer Neg Hx     Colon polyps Neg Hx     Ulcerative colitis Neg Hx     Crohn's disease Neg Hx        Surgical History:   Past Surgical History:   Procedure Laterality Date    BREAST SURGERY      TONSILLECTOMY      WRIST SURGERY         No current facility-administered medications for this encounter.    Current Outpatient Medications:     cholecalciferol (VITAMIN D3) 25 MCG (1000 UT) tablet, Take 1 tablet by mouth 3 (Three) Times a Week., Disp: , Rfl:     cyanocobalamin 1000 MCG/ML injection, Inject 1 mL under the skin into the appropriate area as directed 1 (One) Time Per Week., Disp: 4 mL, Rfl: 12    l-methylfolate 15 MG tablet tablet, Take  by mouth., Disp: , Rfl:     metroNIDAZOLE (FLAGYL) 250 MG tablet, Take 1 tablet by mouth 3 (Three) Times a Day., Disp: 30 tablet, Rfl: 1    nitrofurantoin, macrocrystal-monohydrate, (MACROBID) 100 MG capsule, , Disp: , Rfl:     Norethin-Eth Estrad-Fe Biphas (LO LOESTRIN FE) 1 MG-10 MCG / 10 MCG tablet, Daily., Disp: , Rfl:     ramelteon (ROZEREM) 8 MG tablet, Take 1 tablet by mouth Daily., Disp: , Rfl:     Syringe/Needle, Disp, (B-D 3CC LUER-CHIARA SYR 25GX5/8\") " "25G X 5/8\" 3 ML misc, Use with B-12 injection once weekly, Disp: 12 each, Rfl: 0    Syringe/Needle, Disp, (B-D INTEGRA SYRINGE) 25G X 5/8\" 3 ML misc, USE WITH B-12 INJECTION ONCE EVERY WEEK, Disp: 4 each, Rfl: 1    uribel (URO-MP) 118 MG capsule capsule, Take 1 capsule by mouth., Disp: , Rfl:     Allergies:   Allergies   Allergen Reactions    Cat Hair Extract Hives    Prednisone Hives        The following portions of the patient's history were reviewed by me and updated as appropriate: review of systems, allergies, current medications, past family history, past medical history, past social history, past surgical history and problem list.    There were no vitals filed for this visit.    PHYSICAL EXAM:    CONSTITUTIONAL:  today's vital signs reviewed by me  GASTROINTESTINAL: abdomen is soft nontender nondistended with normal active bowel sounds, no masses are appreciated    Assessment/ Plan  Bloating  Crc screening    Egd and colonoscopy    Risks and benefits as well as alternatives to endoscopic evaluation were explained to the patient and they voiced understanding and wish to proceed.  These risks include but are not limited to the risk of bleeding, perforation, adverse reaction to sedation, and missed lesions.  The patient was given the opportunity to ask questions prior to the endoscopic procedure.           "

## 2023-09-14 NOTE — ANESTHESIA PREPROCEDURE EVALUATION
Anesthesia Evaluation     Patient summary reviewed and Nursing notes reviewed   no history of anesthetic complications:   NPO Solid Status: > 8 hours  NPO Liquid Status: > 2 hours           Airway   Mallampati: II  TM distance: >3 FB  Neck ROM: full  Dental      Pulmonary    Cardiovascular         Neuro/Psych  GI/Hepatic/Renal/Endo      Musculoskeletal     Abdominal    Substance History      OB/GYN          Other                      Anesthesia Plan    ASA 1     MAC     intravenous induction     Anesthetic plan, risks, benefits, and alternatives have been provided, discussed and informed consent has been obtained with: patient.    CODE STATUS:

## 2023-09-15 LAB — UREASE TISS QL: POSITIVE

## 2023-09-18 ENCOUNTER — TELEPHONE (OUTPATIENT)
Dept: GASTROENTEROLOGY | Facility: CLINIC | Age: 48
End: 2023-09-18
Payer: OTHER GOVERNMENT

## 2023-09-18 NOTE — TELEPHONE ENCOUNTER
"Patient left a Voice Message with two concerns after last CLS and Urease For H.pylori test being positive:    What is she supposed to do now? What's the Tx?    How contagious is that bacteria? Her  and son are having \"similar symptoms\" now.    Requesting a call back with medical advice.   "

## 2023-09-19 DIAGNOSIS — A04.8 BACTERIAL INFECTION DUE TO HELICOBACTER PYLORI: Primary | ICD-10-CM

## 2023-09-19 LAB
LAB AP CASE REPORT: NORMAL
LAB AP CLINICAL INFORMATION: NORMAL
PATH REPORT.FINAL DX SPEC: NORMAL
PATH REPORT.GROSS SPEC: NORMAL

## 2023-09-19 RX ORDER — BISMUTH SUBSALICYLATE 262 MG
524 TABLET,CHEWABLE ORAL 4 TIMES DAILY
Qty: 112 TABLET | Refills: 0 | Status: SHIPPED | OUTPATIENT
Start: 2023-09-19

## 2023-09-19 RX ORDER — OMEPRAZOLE 40 MG/1
40 CAPSULE, DELAYED RELEASE ORAL 2 TIMES DAILY
Qty: 28 CAPSULE | Refills: 0 | Status: SHIPPED | OUTPATIENT
Start: 2023-09-19 | End: 2023-10-03

## 2023-09-19 RX ORDER — TETRACYCLINE HYDROCHLORIDE 500 MG/1
500 CAPSULE ORAL 4 TIMES DAILY
Qty: 56 CAPSULE | Refills: 0 | Status: SHIPPED | OUTPATIENT
Start: 2023-09-19

## 2023-09-19 RX ORDER — METRONIDAZOLE 250 MG/1
250 TABLET ORAL 4 TIMES DAILY
Qty: 56 TABLET | Refills: 0 | Status: SHIPPED | OUTPATIENT
Start: 2023-09-19 | End: 2023-10-03

## 2023-09-19 NOTE — TELEPHONE ENCOUNTER
RN called and discussed methods of contraction regarding the H.pylori with patient. Pt reports that she would like orders placed for breath testing for both her son and daughter, both are patient's of Dr. Vargas. RN will place orders and pend both to provider. Lea Monique RN did call and discuss treatment with patient. EL

## 2023-09-20 ENCOUNTER — TELEPHONE (OUTPATIENT)
Dept: GASTROENTEROLOGY | Facility: CLINIC | Age: 48
End: 2023-09-20
Payer: OTHER GOVERNMENT

## 2023-09-20 NOTE — TELEPHONE ENCOUNTER
Pt sent a message through her [employer] with concerns about her son,  and daughter.  Spoke with patient, explained that concerns or questions should be sent through that persons [employer].

## 2023-09-29 ENCOUNTER — TELEPHONE (OUTPATIENT)
Dept: GASTROENTEROLOGY | Facility: CLINIC | Age: 48
End: 2023-09-29
Payer: OTHER GOVERNMENT

## 2023-09-29 RX ORDER — ONDANSETRON 4 MG/1
TABLET, ORALLY DISINTEGRATING ORAL
Qty: 30 TABLET | Refills: 1 | Status: SHIPPED | OUTPATIENT
Start: 2023-09-29

## 2023-09-29 NOTE — TELEPHONE ENCOUNTER
Patient left a Voice Message stating that she is having severe nausea and  requesting a Rx of Zofran . She thinks that Phenergan does not work that well.      [id number] - [place], [place] - [address] - [phone]  - [phone] FX [[id number]]     Also: Should she still be on probiotics? And for how long? She couldn't find anything on chart.

## 2023-09-29 NOTE — TELEPHONE ENCOUNTER
I called patient and informed her about Rx being sent to her pharmacy.     She clarified that she has not been on Probiotics but given her past Hx of SIBO and current Dx of H pylori she wonders if using it would be helpful.

## 2023-10-02 ENCOUNTER — TELEPHONE (OUTPATIENT)
Dept: GASTROENTEROLOGY | Facility: CLINIC | Age: 48
End: 2023-10-02
Payer: OTHER GOVERNMENT

## 2023-10-02 DIAGNOSIS — A04.8 BACTERIAL INFECTION DUE TO HELICOBACTER PYLORI: Primary | ICD-10-CM

## 2023-10-02 RX ORDER — AMOXICILLIN 500 MG/1
1000 TABLET, FILM COATED ORAL 2 TIMES DAILY
Qty: 40 TABLET | Refills: 0 | Status: SHIPPED | OUTPATIENT
Start: 2023-10-02 | End: 2023-10-12

## 2023-10-02 RX ORDER — LEVOFLOXACIN 500 MG/1
500 TABLET, FILM COATED ORAL DAILY
Qty: 10 TABLET | Refills: 0 | Status: SHIPPED | OUTPATIENT
Start: 2023-10-02 | End: 2023-10-12

## 2023-10-02 NOTE — TELEPHONE ENCOUNTER
Patient calls stating she started h-pylori abx rx.  Has had severe n/v along with throat swelling. States she dc'd abx but continued pepto bismol and omeprazole and sx were better.  She would like to try a different abx.

## 2023-10-02 NOTE — TELEPHONE ENCOUNTER
RN called and spoke with patient. Pt reports she has taken Levaquin in the past without issue. Pt would like to know if she should restart metronidazole or not? Please advise.EL        Patient has also tolerated amoxicillin in the past. Verified in first phone call. HEATHER

## 2023-10-03 NOTE — TELEPHONE ENCOUNTER
RN called and discussed updated medications and treatment plan. Discussed possible side effects and adverse reactions regarding Levaquin. Encouraged patient to increase fluid intake during treatment. Pt verbalized understanding and is agreeable. EL

## 2023-10-13 DIAGNOSIS — E53.8 VITAMIN B12 DEFICIENCY: ICD-10-CM

## 2023-10-21 ENCOUNTER — LAB (OUTPATIENT)
Dept: LAB | Facility: HOSPITAL | Age: 48
End: 2023-10-21
Payer: OTHER GOVERNMENT

## 2023-10-21 PROCEDURE — 83013 H PYLORI (C-13) BREATH: CPT | Performed by: NURSE PRACTITIONER

## 2023-10-23 NOTE — PROGRESS NOTES
"No chief complaint on file.          History of Present Illness    47 year old female presents today for follow up.   She was diagnosed with H pylori and breath test confirmed eradication.   She has had improvement in bloating but is still bloated.   She is still unable to wear her wedding rings.         Review of Systems     Result Review :       UPPER GI ENDOSCOPY (09/14/2023 11:38) gastritis and nodule   COLONOSCOPY (09/14/2023 11:38) nl 7-10 years  H. Pylori Breath Test - Breath, Lung (10/21/2023 12:41) neg  Urease For H Pylori - Tissue, Gastric, Antrum (09/14/2023 11:57) positive  Tissue Pathology Exam (09/14/2023 11:55) nodule--H. Pylori   Vital Signs:   /70   Pulse 85   Temp 98.2 °F (36.8 °C)   Ht 162.6 cm (64\")   Wt 69.8 kg (153 lb 12.8 oz)   SpO2 98%   BMI 26.40 kg/m²     Body mass index is 26.4 kg/m².     Physical Exam  Vitals reviewed.   Constitutional:       General: She is not in acute distress.     Appearance: Normal appearance. She is not ill-appearing.   Eyes:      General: No scleral icterus.  Pulmonary:      Effort: Pulmonary effort is normal. No respiratory distress.   Abdominal:      General: Bowel sounds are normal. There is no distension.      Palpations: Abdomen is soft. Abdomen is not rigid. There is no pulsatile mass.      Tenderness: There is no abdominal tenderness. There is no guarding or rebound.   Skin:     Coloration: Skin is not jaundiced.   Neurological:      Mental Status: She is alert and oriented to person, place, and time.   Psychiatric:         Thought Content: Thought content normal.         Judgment: Judgment normal.           Assessment and Plan    Diagnoses and all orders for this visit:    1. Abdominal bloating (Primary)    2. Colon cancer screening       Contine B12 injections weekly        I have reviewed and confirmed the accuracy of the HPI and Assessment and Plan as documented by TRENTON Menchaca        Follow Up   No follow-ups on " file.    There are no Patient Instructions on file for this visit.

## 2023-10-25 ENCOUNTER — OFFICE VISIT (OUTPATIENT)
Dept: GASTROENTEROLOGY | Facility: CLINIC | Age: 48
End: 2023-10-25
Payer: OTHER GOVERNMENT

## 2023-10-25 VITALS
BODY MASS INDEX: 26.26 KG/M2 | TEMPERATURE: 98.2 F | HEART RATE: 85 BPM | WEIGHT: 153.8 LBS | DIASTOLIC BLOOD PRESSURE: 70 MMHG | OXYGEN SATURATION: 98 % | HEIGHT: 64 IN | SYSTOLIC BLOOD PRESSURE: 106 MMHG

## 2023-10-25 DIAGNOSIS — R14.0 ABDOMINAL BLOATING: Primary | ICD-10-CM

## 2023-10-25 DIAGNOSIS — Z12.11 COLON CANCER SCREENING: ICD-10-CM

## 2023-10-25 RX ORDER — NITROFURANTOIN 25; 75 MG/1; MG/1
CAPSULE ORAL
COMMUNITY
Start: 2023-10-17

## 2023-10-25 RX ORDER — BEMPEDOIC ACID 180 MG/1
180 TABLET, FILM COATED ORAL DAILY
COMMUNITY
Start: 2023-08-23

## 2023-10-25 RX ORDER — DULOXETIN HYDROCHLORIDE 20 MG/1
20 CAPSULE, DELAYED RELEASE ORAL DAILY
COMMUNITY
Start: 2023-09-29 | End: 2023-10-29

## 2023-12-01 ENCOUNTER — TELEPHONE (OUTPATIENT)
Dept: GASTROENTEROLOGY | Facility: CLINIC | Age: 48
End: 2023-12-01
Payer: OTHER GOVERNMENT

## 2023-12-01 RX ORDER — HYDROCORTISONE ACETATE PRAMOXINE HCL 2.5; 1 G/100G; G/100G
CREAM TOPICAL
Qty: 30 G | Refills: 1 | Status: SHIPPED | OUTPATIENT
Start: 2023-12-01

## 2023-12-01 NOTE — TELEPHONE ENCOUNTER
Pt LVM stating that she has been having internal hemorrhoids getting worse in the last days. Requesting a Rx .

## 2023-12-01 NOTE — TELEPHONE ENCOUNTER
I called patient and informed them. Pt has tried Anusol before w/out problems, her reaction to oral Prednisone was hyperthermia years ago.    She asked if the cream comes with an applicator, I replied that is -dependant and if it this product in particular doesn't have one she can ask in the pharmacy and we can also try to arrange it.    Pt verbalized understanding and agreement.

## 2024-08-12 RX ORDER — CYANOCOBALAMIN 1000 UG/ML
INJECTION, SOLUTION INTRAMUSCULAR; SUBCUTANEOUS
Qty: 13 ML | Refills: 3 | Status: SHIPPED | OUTPATIENT
Start: 2024-08-12

## 2024-10-08 ENCOUNTER — APPOINTMENT (OUTPATIENT)
Dept: WOMENS IMAGING | Facility: HOSPITAL | Age: 49
End: 2024-10-08
Payer: OTHER GOVERNMENT

## 2024-10-08 PROCEDURE — 77063 BREAST TOMOSYNTHESIS BI: CPT | Performed by: RADIOLOGY

## 2024-10-08 PROCEDURE — 77067 SCR MAMMO BI INCL CAD: CPT | Performed by: RADIOLOGY

## 2024-11-19 ENCOUNTER — TELEPHONE (OUTPATIENT)
Dept: GASTROENTEROLOGY | Facility: CLINIC | Age: 49
End: 2024-11-19
Payer: OTHER GOVERNMENT

## 2024-11-19 DIAGNOSIS — E53.8 VITAMIN B12 DEFICIENCY: ICD-10-CM

## 2024-11-19 RX ORDER — SYRINGE WITH NEEDLE, 1 ML 25GX5/8"
SYRINGE, EMPTY DISPOSABLE MISCELLANEOUS
Qty: 12 EACH | Refills: 3 | Status: SHIPPED | OUTPATIENT
Start: 2024-11-19

## 2024-11-19 RX ORDER — CYANOCOBALAMIN 1000 UG/ML
1000 INJECTION, SOLUTION INTRAMUSCULAR; SUBCUTANEOUS
Qty: 12 ML | Refills: 0 | Status: SHIPPED | OUTPATIENT
Start: 2024-11-19

## 2024-11-19 NOTE — TELEPHONE ENCOUNTER
I sent in a 3-month supply.  Last office visit was over 1 year ago, needs office visit with any provider for more refills.  Thank you.

## 2024-11-19 NOTE — TELEPHONE ENCOUNTER
Patient left a Voice Message stating that she takes B-12 shots weekly. So far [facility] never sends the syringes with the B-12 vials.   She is requesting Rx (both vials and syringes) to be sent to [id number] - [place], [place] - [phone]  - [phone] FX [[id number]]     Former pt of Janette.

## 2025-08-06 DIAGNOSIS — E53.8 VITAMIN B12 DEFICIENCY: ICD-10-CM

## 2025-08-06 RX ORDER — CYANOCOBALAMIN 1000 UG/ML
INJECTION, SOLUTION INTRAMUSCULAR; SUBCUTANEOUS
Qty: 13 ML | Refills: 3 | OUTPATIENT
Start: 2025-08-06

## (undated) DEVICE — ADAPT CLN SCPE ENDO PORPOISE BX/50 DISP

## (undated) DEVICE — FLEX ADVANTAGE 1500CC: Brand: FLEX ADVANTAGE

## (undated) DEVICE — BITEBLOCK OMNI BLOC

## (undated) DEVICE — GOWN PROC ENDOARMOR GI LVL3 HY/SHLD UNIV

## (undated) DEVICE — CANN O2 ETCO2 FITS ALL CONN CO2 SMPL A/ 7IN DISP LF

## (undated) DEVICE — VIAL FORMLN CAP 10PCT 20ML

## (undated) DEVICE — KT ORCA ORCAPOD DISP STRL

## (undated) DEVICE — MSK ENDO PORT O2 POM ELITE CURAPLEX A/

## (undated) DEVICE — Device

## (undated) DEVICE — SINGLE-USE BIOPSY FORCEPS: Brand: RADIAL JAW 4